# Patient Record
Sex: FEMALE | URBAN - NONMETROPOLITAN AREA
[De-identification: names, ages, dates, MRNs, and addresses within clinical notes are randomized per-mention and may not be internally consistent; named-entity substitution may affect disease eponyms.]

---

## 2021-03-24 ENCOUNTER — APPOINTMENT (RX ONLY)
Dept: URBAN - NONMETROPOLITAN AREA CLINIC 13 | Facility: CLINIC | Age: 31
Setting detail: DERMATOLOGY
End: 2021-03-24

## 2021-03-24 DIAGNOSIS — Z41.9 ENCOUNTER FOR PROCEDURE FOR PURPOSES OTHER THAN REMEDYING HEALTH STATE, UNSPECIFIED: ICD-10-CM

## 2021-03-24 PROCEDURE — ? ADDITIONAL NOTES

## 2021-03-24 PROCEDURE — ? COSMETIC CONSULTATION: FILLERS

## 2021-03-24 PROCEDURE — ? COSMETIC CONSULTATION: BOTOX

## 2021-03-24 ASSESSMENT — LOCATION ZONE DERM: LOCATION ZONE: FACE

## 2021-03-24 ASSESSMENT — LOCATION SIMPLE DESCRIPTION DERM: LOCATION SIMPLE: GLABELLA

## 2021-03-24 ASSESSMENT — LOCATION DETAILED DESCRIPTION DERM: LOCATION DETAILED: GLABELLA

## 2021-03-24 NOTE — PROCEDURE: ADDITIONAL NOTES
Additional Notes: Discussed beginning with 10units of Botox in her glabella and one syringe of Juvederm Ultra XC in her nasal folds in patient proceeds with treatment.
Detail Level: Zone
Render Risk Assessment In Note?: no

## 2021-03-24 NOTE — HPI: COSMETIC CONSULTATION
Additional History: Patient would like information on addressing her nasal folds and crease in her glabella.